# Patient Record
Sex: FEMALE | Race: WHITE | NOT HISPANIC OR LATINO
[De-identification: names, ages, dates, MRNs, and addresses within clinical notes are randomized per-mention and may not be internally consistent; named-entity substitution may affect disease eponyms.]

---

## 2021-06-09 PROBLEM — Z00.00 ENCOUNTER FOR PREVENTIVE HEALTH EXAMINATION: Status: ACTIVE | Noted: 2021-06-09

## 2021-06-16 ENCOUNTER — NON-APPOINTMENT (OUTPATIENT)
Age: 21
End: 2021-06-16

## 2021-06-16 ENCOUNTER — APPOINTMENT (OUTPATIENT)
Dept: ORTHOPEDIC SURGERY | Facility: CLINIC | Age: 21
End: 2021-06-16
Payer: COMMERCIAL

## 2021-06-16 DIAGNOSIS — Z87.09 PERSONAL HISTORY OF OTHER DISEASES OF THE RESPIRATORY SYSTEM: ICD-10-CM

## 2021-06-16 DIAGNOSIS — Z78.9 OTHER SPECIFIED HEALTH STATUS: ICD-10-CM

## 2021-06-16 PROCEDURE — 99204 OFFICE O/P NEW MOD 45 MIN: CPT | Mod: 25

## 2021-06-16 PROCEDURE — 20612 ASPIRATE/INJ GANGLION CYST: CPT | Mod: RT

## 2021-06-16 PROCEDURE — 99072 ADDL SUPL MATRL&STAF TM PHE: CPT

## 2021-06-16 NOTE — PHYSICAL EXAM
[Normal RUE] : Right Upper Extremity: No scars, rashes, lesions, ulcers, skin intact [Normal Finger/nose] : finger to nose coordination [Normal] : no peripheral adenopathy appreciated [de-identified] : right wrist:\par Skin intact no swelling no erythema no ecchymosis\par Palpable ganglion cyst at the SL interval, mild tenderness to palpation\par Range of motion of the digits and wrists intact without pain\par Negative [place]\par Neurovascular intact distally [de-identified] : jonor

## 2021-06-16 NOTE — PROCEDURE
[FreeTextEntry1] : right wrist ganglion cyst aspiration:\par Skin prepped with alcohol, anesthetized with 0.25 cc 1% plain lidocaine. 18-gauge needle used to aspirate 2 cc of cyst fluid from the ganglion of the dorsum of the wrist. Compressive bandage applied, patient tolerated the aspiration well.

## 2021-06-16 NOTE — ASSESSMENT
[FreeTextEntry1] : 21-year-old femalewith a right wrist dorsal ganglion cyst status post a successful aspiration today. She will continue light activity and massage the cyst area over the next 3 days, followup if she experiences recurrence of the cyst.

## 2021-06-16 NOTE — HISTORY OF PRESENT ILLNESS
[FreeTextEntry1] : 21-year-old male presents today for evaluation of a right wrist dorsal ganglion cyst. She reports the cyst as been present for many years, denies any acute inciting event. She reports it has not changed in size since onset. She notes that she has developed pain with certain exudate such as weightbearing through the wrist along with lifting objects. She presented today for options for treatment of the cyst as it is interfering with activities at this time.

## 2021-07-20 ENCOUNTER — APPOINTMENT (OUTPATIENT)
Dept: ORTHOPEDIC SURGERY | Facility: CLINIC | Age: 21
End: 2021-07-20
Payer: COMMERCIAL

## 2021-07-20 DIAGNOSIS — M67.431 GANGLION, RIGHT WRIST: ICD-10-CM

## 2021-07-20 PROCEDURE — 99213 OFFICE O/P EST LOW 20 MIN: CPT | Mod: 25

## 2021-07-20 PROCEDURE — 20612 ASPIRATE/INJ GANGLION CYST: CPT | Mod: RT

## 2021-07-20 NOTE — ASSESSMENT
[FreeTextEntry1] : 21-year-old femalewith a right wrist dorsal ganglion cyst status post a successful aspiration today. She will continue light activity and massage the cyst area over the next 3 days, followup if she experiences recurrence of the cyst to schedule surgical excision.

## 2021-07-20 NOTE — PHYSICAL EXAM
[Normal RUE] : Right Upper Extremity: No scars, rashes, lesions, ulcers, skin intact [Normal Finger/nose] : finger to nose coordination [Normal] : no peripheral adenopathy appreciated [de-identified] : right wrist:\par Skin intact no swelling no erythema no ecchymosis\par Palpable ganglion cyst at the SL interval, mild tenderness to palpation\par Range of motion of the digits and wrists intact without pain\par Negative [place]\par Neurovascular intact distally [de-identified] : jonor

## 2021-08-25 PROBLEM — M67.431 GANGLION CYST OF DORSUM OF RIGHT WRIST: Status: ACTIVE | Noted: 2021-06-16

## 2022-05-02 NOTE — HISTORY OF PRESENT ILLNESS
[FreeTextEntry1] : 21-year-old male presents today for evaluation of a right wrist dorsal ganglion cyst. She reports the cyst as been present for many years, denies any acute inciting event. She reports it has not changed in size since onset. She notes that she has developed pain with certain exudate such as weightbearing through the wrist along with lifting objects. She presented today for options for treatment of the cyst as it is interfering with activities at this time.\par \par 07/20/2021: Patient returns today for reevaluation of her right wrist dorsal ganglion cyst. She reports since her last visit shortly following aspiration, the cyst has returned. She reports pain to the wrist with weightbearing activity. \par  Yes